# Patient Record
Sex: MALE | Race: WHITE | NOT HISPANIC OR LATINO
[De-identification: names, ages, dates, MRNs, and addresses within clinical notes are randomized per-mention and may not be internally consistent; named-entity substitution may affect disease eponyms.]

---

## 2020-07-22 ENCOUNTER — APPOINTMENT (OUTPATIENT)
Dept: UROLOGY | Facility: CLINIC | Age: 70
End: 2020-07-22
Payer: COMMERCIAL

## 2020-07-22 VITALS — TEMPERATURE: 97.4 F

## 2020-07-22 VITALS — SYSTOLIC BLOOD PRESSURE: 139 MMHG | DIASTOLIC BLOOD PRESSURE: 77 MMHG | HEART RATE: 74 BPM

## 2020-07-22 DIAGNOSIS — Z00.00 ENCOUNTER FOR GENERAL ADULT MEDICAL EXAMINATION W/OUT ABNORMAL FINDINGS: ICD-10-CM

## 2020-07-22 PROCEDURE — 76857 US EXAM PELVIC LIMITED: CPT

## 2020-07-22 PROCEDURE — 81003 URINALYSIS AUTO W/O SCOPE: CPT | Mod: QW

## 2020-07-22 PROCEDURE — 99215 OFFICE O/P EST HI 40 MIN: CPT | Mod: 25

## 2020-07-22 RX ORDER — SILODOSIN 8 MG/1
8 CAPSULE ORAL DAILY
Qty: 90 | Refills: 3 | Status: ACTIVE | COMMUNITY
Start: 2020-07-22 | End: 1900-01-01

## 2020-07-22 NOTE — PHYSICAL EXAM
[General Appearance - Well Developed] : well developed [General Appearance - Well Nourished] : well nourished [Normal Appearance] : normal appearance [Well Groomed] : well groomed [General Appearance - In No Acute Distress] : no acute distress [Abdomen Soft] : soft [Abdomen Tenderness] : non-tender [Costovertebral Angle Tenderness] : no ~M costovertebral angle tenderness [Urethral Meatus] : meatus normal [Urinary Bladder Findings] : the bladder was normal on palpation [Scrotum] : the scrotum was normal [No Prostate Nodules] : no prostate nodules [Testes Mass (___cm)] : there were no testicular masses [Edema] : no peripheral edema [Respiration, Rhythm And Depth] : normal respiratory rhythm and effort [Exaggerated Use Of Accessory Muscles For Inspiration] : no accessory muscle use [] : no respiratory distress [Mood] : the mood was normal [Affect] : the affect was normal [Oriented To Time, Place, And Person] : oriented to person, place, and time [Normal Station and Gait] : the gait and station were normal for the patient's age [No Focal Deficits] : no focal deficits [Not Anxious] : not anxious [No Palpable Adenopathy] : no palpable adenopathy [Rectal Exam - Rectum] : digital rectal exam was normal

## 2020-07-23 LAB
BILIRUB UR QL STRIP: NORMAL
CLARITY UR: CLEAR
COLLECTION METHOD: NORMAL
GLUCOSE UR-MCNC: NORMAL
HCG UR QL: 0.2 EU/DL
HGB UR QL STRIP.AUTO: NORMAL
KETONES UR-MCNC: NORMAL
LEUKOCYTE ESTERASE UR QL STRIP: NORMAL
NITRITE UR QL STRIP: NORMAL
PH UR STRIP: 8
PROT UR STRIP-MCNC: NORMAL
SP GR UR STRIP: 1.02

## 2020-07-30 NOTE — HISTORY OF PRESENT ILLNESS
[FreeTextEntry1] : Pt is a 69 yo M, former patient of  Dr. Kiser with  hx of BPH and longstanding symptoms of urinary frequency and urgency. Today he presents for follow up and BALDEMAR, he denies any LUTS at the time. \par \par PSA History\par 1.4 ng/mL 2019 \par 1.8 ng/mL 2018\par \par PMH BPH, HCL, depression, kidney stones \par SHx: Hernia repair 1974\par FH: Father tongue and throat cancer, cardiovascular disease \par Social Hx: former smoker (20 years), EtOH consumption daily \par

## 2020-07-30 NOTE — ASSESSMENT
[FreeTextEntry1] : Pt is a 69 yo M with hx of BPH with longstanding symptoms of urinary frequency and urgency. He presents today for a follow up. He is feeling well, denies any current bothersome lower urinary tract symptoms. Will refill prescription of Silodosin 8 mg.

## 2021-07-07 ENCOUNTER — APPOINTMENT (OUTPATIENT)
Dept: UROLOGY | Facility: CLINIC | Age: 71
End: 2021-07-07
Payer: COMMERCIAL

## 2021-07-07 VITALS
SYSTOLIC BLOOD PRESSURE: 132 MMHG | HEIGHT: 68 IN | DIASTOLIC BLOOD PRESSURE: 74 MMHG | TEMPERATURE: 97.8 F | HEART RATE: 74 BPM | BODY MASS INDEX: 19.25 KG/M2 | WEIGHT: 127 LBS

## 2021-07-07 DIAGNOSIS — R97.20 ELEVATED PROSTATE, SPECIFIC ANTIGEN [PSA]: ICD-10-CM

## 2021-07-07 PROCEDURE — 99215 OFFICE O/P EST HI 40 MIN: CPT

## 2021-07-07 PROCEDURE — 99072 ADDL SUPL MATRL&STAF TM PHE: CPT

## 2021-07-07 PROCEDURE — 51798 US URINE CAPACITY MEASURE: CPT

## 2021-07-08 LAB
PSA FREE FLD-MCNC: 19 %
PSA FREE SERPL-MCNC: 0.29 NG/ML
PSA SERPL-MCNC: 1.54 NG/ML

## 2021-07-08 NOTE — PHYSICAL EXAM
[General Appearance - Well Developed] : well developed [General Appearance - Well Nourished] : well nourished [Normal Appearance] : normal appearance [Well Groomed] : well groomed [General Appearance - In No Acute Distress] : no acute distress [Edema] : no peripheral edema [] : no respiratory distress [Exaggerated Use Of Accessory Muscles For Inspiration] : no accessory muscle use [Oriented To Time, Place, And Person] : oriented to person, place, and time [Affect] : the affect was normal [Mood] : the mood was normal [Not Anxious] : not anxious [Normal Station and Gait] : the gait and station were normal for the patient's age [No Focal Deficits] : no focal deficits [Prostate Tenderness] : the prostate was not tender [No Prostate Nodules] : no prostate nodules [FreeTextEntry1] : enlarged prostate

## 2021-07-08 NOTE — ASSESSMENT
[FreeTextEntry1] : Pt is a 71 yo M with hx of BPH with longstanding symptoms of urinary frequency and urgency. He presents today for a follow up. I will renew rx 8 mg Silodosin and I sent bloodwork for PSA. Pt will be notified of PSA results in 2-3 days.  \par \par Pt expressed understanding.\par \par PVR: 102 cc (done to rule out incomplete bladder emptying)\par

## 2021-07-08 NOTE — HISTORY OF PRESENT ILLNESS
[FreeTextEntry1] : Pt is a 70 yo M, former patient of  Dr. Kiser with  hx of BPH and longstanding symptoms of urinary frequency and urgency (see chart notes for more details). Today, he presents for a follow up. Pt reports no new or worsening symptoms.  \par \par Of note, he has been sheltering in [place] in the setting of COVID. \par \par Full Hx:\par He last presented 6/22/2020 for follow up and BALDEMAR, he denied any LUTS at the time. \par \par Udip: negative\par US urinary bladder 6/23/2020: pvr 15 cc/ prostate 45 cc\par \par \par PSA History\par 1.4 ng/mL 2019 \par 1.8 ng/mL 2018\par \par PMH BPH, HCL, depression, kidney stones \par SHx: Hernia repair 1974\par FH: Father tongue and throat cancer, cardiovascular disease \par Social Hx: former smoker (20 years), EtOH consumption daily \par

## 2021-07-09 ENCOUNTER — NON-APPOINTMENT (OUTPATIENT)
Age: 71
End: 2021-07-09

## 2021-10-01 ENCOUNTER — NON-APPOINTMENT (OUTPATIENT)
Age: 71
End: 2021-10-01

## 2022-03-24 RX ORDER — SILODOSIN 8 MG/1
8 CAPSULE ORAL DAILY
Qty: 90 | Refills: 3 | Status: ACTIVE | COMMUNITY
Start: 2021-07-07 | End: 1900-01-01

## 2022-05-22 ENCOUNTER — NON-APPOINTMENT (OUTPATIENT)
Age: 72
End: 2022-05-22

## 2022-05-31 ENCOUNTER — APPOINTMENT (OUTPATIENT)
Dept: UROLOGY | Facility: CLINIC | Age: 72
End: 2022-05-31
Payer: COMMERCIAL

## 2022-05-31 VITALS
TEMPERATURE: 97.9 F | OXYGEN SATURATION: 98 % | HEART RATE: 70 BPM | SYSTOLIC BLOOD PRESSURE: 129 MMHG | DIASTOLIC BLOOD PRESSURE: 65 MMHG

## 2022-05-31 PROCEDURE — 99214 OFFICE O/P EST MOD 30 MIN: CPT

## 2022-05-31 PROCEDURE — 51798 US URINE CAPACITY MEASURE: CPT

## 2022-05-31 PROCEDURE — 81003 URINALYSIS AUTO W/O SCOPE: CPT | Mod: QW

## 2022-05-31 RX ORDER — SILODOSIN 8 MG/1
8 CAPSULE ORAL
Qty: 90 | Refills: 3 | Status: ACTIVE | COMMUNITY
Start: 2022-05-31 | End: 1900-01-01

## 2022-06-02 LAB
APPEARANCE: CLEAR
BACTERIA: NEGATIVE
BILIRUB UR QL STRIP: NORMAL
BILIRUBIN URINE: NEGATIVE
BLOOD URINE: NEGATIVE
CLARITY UR: CLEAR
COLLECTION METHOD: NORMAL
COLOR: NORMAL
GLUCOSE QUALITATIVE U: NEGATIVE
GLUCOSE UR-MCNC: NORMAL
HCG UR QL: 0.2 EU/DL
HGB UR QL STRIP.AUTO: NORMAL
HYALINE CASTS: 0 /LPF
KETONES UR-MCNC: NORMAL
KETONES URINE: NEGATIVE
LEUKOCYTE ESTERASE UR QL STRIP: NORMAL
LEUKOCYTE ESTERASE URINE: NEGATIVE
MICROSCOPIC-UA: NORMAL
NITRITE UR QL STRIP: NORMAL
NITRITE URINE: NEGATIVE
PH UR STRIP: 5.5
PH URINE: 6
PROT UR STRIP-MCNC: NORMAL
PROTEIN URINE: NORMAL
RED BLOOD CELLS URINE: 1 /HPF
SP GR UR STRIP: 1.02
SPECIFIC GRAVITY URINE: 1.02
SQUAMOUS EPITHELIAL CELLS: 0 /HPF
UROBILINOGEN URINE: NORMAL
WHITE BLOOD CELLS URINE: 1 /HPF

## 2022-06-02 NOTE — ASSESSMENT
[FreeTextEntry1] : Pt is a 69 yo M with hx of BPH with longstanding symptoms of urinary frequency and urgency. He presents today for a follow up. I renewed rx 8 mg Silodosin. He will be seeing his pcp next month and will have PSA drawn and results sent to me.  \par \par Pt expressed understanding.\par \par \par

## 2022-06-02 NOTE — HISTORY OF PRESENT ILLNESS
[FreeTextEntry1] : Pt is a 70 yo M, former patient of  Dr. Kiser with  hx of BPH and longstanding symptoms of urinary frequency and urgency (see chart notes for more details). Today, he presents for a follow up. Pt reports no new or worsening symptoms.  He states that he will be seeing his pcp next month and will have PSA drawn then. \par \par Of note, he has been sheltering in [place] in the setting of COVID. \par \par PVR: 102 cc (to rule out incomplete bladder emptying) \par \par Full Hx:\par He last presented 6/22/2020 for follow up and BALDEMAR, he denied any LUTS at the time. \par \par Udip: negative\par US urinary bladder 6/23/2020: pvr 15 cc/ prostate 45 cc\par \par \par PSA History\par 1.54 ng/mL 2021 \par 1.4 ng/mL 2019 \par 1.8 ng/mL 2018\par \par \par PMH BPH, HCL, depression, kidney stones \par SHx: Hernia repair 1974\par FH: Father tongue and throat cancer, cardiovascular disease \par Social Hx: former smoker (20 years), EtOH consumption daily \par

## 2022-06-02 NOTE — PHYSICAL EXAM
[General Appearance - Well Developed] : well developed [General Appearance - Well Nourished] : well nourished [Normal Appearance] : normal appearance [Well Groomed] : well groomed [General Appearance - In No Acute Distress] : no acute distress [Prostate Tenderness] : the prostate was not tender [No Prostate Nodules] : no prostate nodules [Edema] : no peripheral edema [] : no respiratory distress [Exaggerated Use Of Accessory Muscles For Inspiration] : no accessory muscle use [Oriented To Time, Place, And Person] : oriented to person, place, and time [Affect] : the affect was normal [Mood] : the mood was normal [Not Anxious] : not anxious [Normal Station and Gait] : the gait and station were normal for the patient's age [No Focal Deficits] : no focal deficits [FreeTextEntry1] : non-nodular, non-tender

## 2023-04-18 ENCOUNTER — NON-APPOINTMENT (OUTPATIENT)
Age: 73
End: 2023-04-18

## 2023-05-30 ENCOUNTER — APPOINTMENT (OUTPATIENT)
Dept: UROLOGY | Facility: CLINIC | Age: 73
End: 2023-05-30
Payer: COMMERCIAL

## 2023-05-30 VITALS
OXYGEN SATURATION: 99 % | SYSTOLIC BLOOD PRESSURE: 122 MMHG | TEMPERATURE: 98 F | HEART RATE: 67 BPM | BODY MASS INDEX: 18.94 KG/M2 | HEIGHT: 68 IN | WEIGHT: 125 LBS | DIASTOLIC BLOOD PRESSURE: 80 MMHG

## 2023-05-30 LAB
BILIRUB UR QL STRIP: NORMAL
CLARITY UR: CLEAR
COLLECTION METHOD: NORMAL
GLUCOSE UR-MCNC: NORMAL
HCG UR QL: 0.2 EU/DL
HGB UR QL STRIP.AUTO: NORMAL
KETONES UR-MCNC: NORMAL
LEUKOCYTE ESTERASE UR QL STRIP: NORMAL
NITRITE UR QL STRIP: NORMAL
PH UR STRIP: 5.5
PROT UR STRIP-MCNC: NORMAL
SP GR UR STRIP: 1.02

## 2023-05-30 PROCEDURE — 81003 URINALYSIS AUTO W/O SCOPE: CPT | Mod: QW

## 2023-05-30 PROCEDURE — 99214 OFFICE O/P EST MOD 30 MIN: CPT

## 2023-05-30 PROCEDURE — 51798 US URINE CAPACITY MEASURE: CPT

## 2023-05-30 NOTE — PHYSICAL EXAM
[General Appearance - Well Developed] : well developed [General Appearance - Well Nourished] : well nourished [Normal Appearance] : normal appearance [Well Groomed] : well groomed [General Appearance - In No Acute Distress] : no acute distress [Prostate Tenderness] : the prostate was not tender [No Prostate Nodules] : no prostate nodules [FreeTextEntry1] : non-nodular, non-tender  [Edema] : no peripheral edema [] : no respiratory distress [Exaggerated Use Of Accessory Muscles For Inspiration] : no accessory muscle use [Oriented To Time, Place, And Person] : oriented to person, place, and time [Affect] : the affect was normal [Mood] : the mood was normal [Not Anxious] : not anxious [Normal Station and Gait] : the gait and station were normal for the patient's age [No Focal Deficits] : no focal deficits

## 2023-05-30 NOTE — HISTORY OF PRESENT ILLNESS
[FreeTextEntry1] : Pt is a 72 yo M with  hx of BPH and longstanding symptoms of urinary frequency and urgency (see chart notes for more details). Today, he presents for a follow up. Pt reports no new or worsening symptoms.  He states that he recently had blood drawn and will be seeing his PCP next week.  PSA from 5/2023- 1.3ng\par  \par Of note, he has been sheltering in [place] in the setting of [employer].   at [facility]- back 3days/wk. \par \par PVR: 260- voided again- 120cc (done to rule out incomplete bladder emptying) \par \par Full Hx:\par He last presented 6/22/2020 for follow up and BALDEMAR, he denied any LUTS at the time. \par \par Udip: negative\par US urinary bladder 6/23/2020: pvr 15 cc/ prostate 45 cc\par \par \par PSA History\par 1.54 ng/mL 2021 \par 1.4 ng/mL 2019 \par 1.8 ng/mL 2018\par \par \par PMH BPH, HCL, depression, kidney stones \par SHx: Hernia repair 1974\par FH: Father tongue and throat cancer, cardiovascular disease \par Social Hx: former smoker (20 years), EtOH consumption daily \par

## 2023-05-30 NOTE — ASSESSMENT
[FreeTextEntry1] : Pt is a 71 yo M with hx of BPH with longstanding symptoms of urinary frequency and urgency. He presents today for a follow up. I renewed rx 8 mg Silodosin as this manages his bladder symptoms well. PSA drawn last week- within normal limits.  BALDEMAR unremarkable.  \par \par I look forward to seeing Mr. Vigil in 1 year, or earlier if the need arises. \par \par Pt expressed understanding.\par \par \par

## 2024-04-08 ENCOUNTER — APPOINTMENT (OUTPATIENT)
Dept: UROLOGY | Facility: CLINIC | Age: 74
End: 2024-04-08
Payer: COMMERCIAL

## 2024-04-08 VITALS
DIASTOLIC BLOOD PRESSURE: 80 MMHG | HEART RATE: 65 BPM | OXYGEN SATURATION: 99 % | SYSTOLIC BLOOD PRESSURE: 158 MMHG | TEMPERATURE: 97.5 F

## 2024-04-08 DIAGNOSIS — R30.0 DYSURIA: ICD-10-CM

## 2024-04-08 PROCEDURE — 99214 OFFICE O/P EST MOD 30 MIN: CPT

## 2024-04-09 DIAGNOSIS — N40.1 BENIGN PROSTATIC HYPERPLASIA WITH LOWER URINARY TRACT SYMPMS: ICD-10-CM

## 2024-04-09 LAB
PSA FREE FLD-MCNC: 22 %
PSA FREE SERPL-MCNC: 0.4 NG/ML
PSA SERPL-MCNC: 1.8 NG/ML

## 2024-04-09 NOTE — PHYSICAL EXAM
[General Appearance - Well Developed] : well developed [General Appearance - Well Nourished] : well nourished [Normal Appearance] : normal appearance [Well Groomed] : well groomed [General Appearance - In No Acute Distress] : no acute distress [Testes Tenderness] : no tenderness of the testes [Testes Mass (___cm)] : there were no testicular masses [Prostate Tenderness] : the prostate was not tender [No Prostate Nodules] : no prostate nodules [Edema] : no peripheral edema [] : no respiratory distress [Exaggerated Use Of Accessory Muscles For Inspiration] : no accessory muscle use [Oriented To Time, Place, And Person] : oriented to person, place, and time [Affect] : the affect was normal [Mood] : the mood was normal [Not Anxious] : not anxious [Normal Station and Gait] : the gait and station were normal for the patient's age [No Focal Deficits] : no focal deficits [FreeTextEntry1] : firm prostate

## 2024-04-09 NOTE — ASSESSMENT
[FreeTextEntry1] : I discussed the findings and options with Mr. BRODY REINA in detail.   1. PSA was drawn today.  Patient recently went horseback riding and I will repeat his PSA if it returns elevated.   2. Mr. REINA reports to be well managed with no new urinary complaints, continue Silodosin as this manages his bladder symptoms well.  3. Urine was sent for UA UC.   Return for f/u to discuss PSA and urine studies. Patient expressed understanding.

## 2024-04-09 NOTE — HISTORY OF PRESENT ILLNESS
[FreeTextEntry1] : 04/08/2024 -- Pt is a 74 yo M with hx of BPH and longstanding symptoms of urinary frequency and urgency. Here today for annual f/u- patient well managed with Silodosin. However, patient reports blood-tinged seminal fluid which is new to him. Denies pain/burning with ejaculation. Otherwise, he has no new urinary/ obstructive-like symptoms. He has not had any recent UTIs. Denies gross hematuria or dysuria.   PVR: 267cc (done to rule out incomplete bladder emptying)    PSA from 5/2023- 1.3ng/mL   5/30/23-- Pt is a 72 yo M with  hx of BPH and longstanding symptoms of urinary frequency and urgency (see chart notes for more details). Today, he presents for a follow up. Pt reports no new or worsening symptoms.  He states that he recently had blood drawn and will be seeing his PCP next week.  PSA from 5/2023- 1.3ng   Of note, he has been sheltering in [place] in the setting of [facility].   at [facility]- back 3days/wk.   PVR: 260- voided again- 120cc (done to rule out incomplete bladder emptying)   Full Hx: He last presented 6/22/2020 for follow up and BALDEMAR, he denied any LUTS at the time.   Udip: negative US urinary bladder 6/23/2020: pvr 15 cc/ prostate 45 cc   PSA History 1.54 ng/mL 2021  1.4 ng/mL 2019  1.8 ng/mL 2018   PMH BPH, HCL, depression, kidney stones  SHx: Hernia repair 1974 FH: Father tongue and throat cancer, cardiovascular disease  Social Hx: former smoker (20 years), EtOH consumption daily

## 2024-04-09 NOTE — ADDENDUM
[FreeTextEntry1] : A portion of this note was written by [Erasto Ramirez] on 04/08/2024 acting as a scribe for Dr. Forbes.   I have personally reviewed the chart and agree that the record accurately reflects my personal performance of the history, physical exam, assessment, and plan.

## 2024-04-25 ENCOUNTER — APPOINTMENT (OUTPATIENT)
Dept: UROLOGY | Facility: CLINIC | Age: 74
End: 2024-04-25
Payer: COMMERCIAL

## 2024-04-25 PROCEDURE — ZZZZZ: CPT

## 2024-04-25 NOTE — ASSESSMENT
[FreeTextEntry1] : I discussed the findings and options with . BRODY REINA in detail.   PSA is wnl at 1.8.  Culture and UA were normal findings.   The patient will return to office in 1 year for reassessment.

## 2024-04-25 NOTE — HISTORY OF PRESENT ILLNESS
[Other Location: e.g. School (Enter Location, City,State)___] : at [unfilled], at the time of the visit. [FreeTextEntry1] : 04/25/2024: Pt is a 72 yo M with hx of BPH and longstanding symptoms of urinary frequency and urgency. Here today as a telephonic visit to discuss PSA. PSA is wnl at 1.8.  Culture and UA were normal findings.   04/08/2024 -- Pt is a 72 yo M with hx of BPH and longstanding symptoms of urinary frequency and urgency. Here today for annual f/u- patient well managed with Silodosin. However, patient reports blood-tinged seminal fluid which is new to him. Denies pain/burning with ejaculation. Otherwise, he has no new urinary/ obstructive-like symptoms. He has not had any recent UTIs. Denies gross hematuria or dysuria.   PVR: 267cc (done to rule out incomplete bladder emptying)    PSA from 5/2023- 1.3ng/mL   5/30/23-- Pt is a 72 yo M with  hx of BPH and longstanding symptoms of urinary frequency and urgency (see chart notes for more details). Today, he presents for a follow up. Pt reports no new or worsening symptoms.  He states that he recently had blood drawn and will be seeing his PCP next week.  PSA from 5/2023- 1.3ng   Of note, he has been sheltering in [place] in the setting of [employer].   at [facility]- back 3days/wk.   PVR: 260- voided again- 120cc (done to rule out incomplete bladder emptying)   Full Hx: He last presented 6/22/2020 for follow up and BALDEMAR, he denied any LUTS at the time.   Udip: negative US urinary bladder 6/23/2020: pvr 15 cc/ prostate 45 cc   PSA History 1.54 ng/mL 2021  1.4 ng/mL 2019  1.8 ng/mL 2018   PMH BPH, HCL, depression, kidney stones  SHx: Hernia repair 1974 FH: Father tongue and throat cancer, cardiovascular disease  Social Hx: former smoker (20 years), EtOH consumption daily

## 2024-04-25 NOTE — ADDENDUM
[FreeTextEntry1] : A portion of this note was written by Abbi Jimenez on 04/25/2024 acting as a scribe for Dr. Forbes.   I have personally reviewed the chart and agree that the record accurately reflects my personal performance of the history, physical exam, assessment, and plan.

## 2025-07-14 ENCOUNTER — NON-APPOINTMENT (OUTPATIENT)
Age: 75
End: 2025-07-14

## 2025-07-15 ENCOUNTER — APPOINTMENT (OUTPATIENT)
Dept: UROLOGY | Facility: CLINIC | Age: 75
End: 2025-07-15
Payer: COMMERCIAL

## 2025-07-15 VITALS
WEIGHT: 125 LBS | SYSTOLIC BLOOD PRESSURE: 116 MMHG | HEART RATE: 62 BPM | TEMPERATURE: 98.2 F | HEIGHT: 68 IN | DIASTOLIC BLOOD PRESSURE: 70 MMHG | OXYGEN SATURATION: 97 % | BODY MASS INDEX: 18.94 KG/M2

## 2025-07-15 PROCEDURE — 51798 US URINE CAPACITY MEASURE: CPT

## 2025-07-15 PROCEDURE — 99214 OFFICE O/P EST MOD 30 MIN: CPT
